# Patient Record
Sex: MALE | Race: WHITE | Employment: FULL TIME | URBAN - METROPOLITAN AREA
[De-identification: names, ages, dates, MRNs, and addresses within clinical notes are randomized per-mention and may not be internally consistent; named-entity substitution may affect disease eponyms.]

---

## 2018-04-18 VITALS
SYSTOLIC BLOOD PRESSURE: 124 MMHG | TEMPERATURE: 98.3 F | RESPIRATION RATE: 16 BRPM | HEIGHT: 75 IN | DIASTOLIC BLOOD PRESSURE: 74 MMHG | WEIGHT: 282 LBS | BODY MASS INDEX: 35.06 KG/M2 | HEART RATE: 75 BPM

## 2018-04-18 PROBLEM — M77.11 LATERAL EPICONDYLITIS OF RIGHT ELBOW: Status: ACTIVE | Noted: 2017-12-11

## 2018-04-18 RX ORDER — LEVOTHYROXINE SODIUM 0.1 MG/1
100 TABLET ORAL
COMMUNITY
End: 2018-12-11 | Stop reason: SDUPTHER

## 2018-04-18 RX ORDER — FENOFIBRATE 145 MG/1
145 TABLET, COATED ORAL
COMMUNITY
End: 2018-06-05 | Stop reason: SDUPTHER

## 2018-04-18 RX ORDER — LISINOPRIL 10 MG/1
10 TABLET ORAL
COMMUNITY
End: 2018-06-05 | Stop reason: SDUPTHER

## 2018-04-18 RX ORDER — ATORVASTATIN CALCIUM 20 MG/1
20 TABLET, FILM COATED ORAL
COMMUNITY
End: 2018-06-05 | Stop reason: SDUPTHER

## 2018-06-01 LAB
ALBUMIN SERPL-MCNC: 4.5 G/DL
ALP BLD-CCNC: 61 U/L
ALT SERPL-CCNC: 22 U/L
ANION GAP SERPL CALCULATED.3IONS-SCNC: 13 MMOL/L
AST SERPL-CCNC: 20 U/L
AVERAGE GLUCOSE: 116.9
BILIRUB SERPL-MCNC: 0.4 MG/DL (ref 0.1–1.4)
BUN BLDV-MCNC: 15 MG/DL
CALCIUM SERPL-MCNC: 9.5 MG/DL
CHLORIDE BLD-SCNC: 103 MMOL/L
CHOLESTEROL, TOTAL: 153 MG/DL
CHOLESTEROL/HDL RATIO: NORMAL
CO2: 24 MMOL/L
CREAT SERPL-MCNC: 1.1 MG/DL
GFR CALCULATED: >60
GLUCOSE BLD-MCNC: 104 MG/DL
HBA1C MFR BLD: 5.7 %
HDLC SERPL-MCNC: 40 MG/DL (ref 35–70)
LDL CHOLESTEROL CALCULATED: 64 MG/DL (ref 0–160)
POTASSIUM SERPL-SCNC: 5 MMOL/L
PROSTATE SPECIFIC ANTIGEN: 1.27 NG/ML
SODIUM BLD-SCNC: 140 MMOL/L
TOTAL PROTEIN: 6.5
TRIGL SERPL-MCNC: 243 MG/DL
VLDLC SERPL CALC-MCNC: 49 MG/DL

## 2018-06-05 ENCOUNTER — OFFICE VISIT (OUTPATIENT)
Dept: PRIMARY CARE CLINIC | Age: 58
End: 2018-06-05

## 2018-06-05 VITALS
SYSTOLIC BLOOD PRESSURE: 118 MMHG | HEART RATE: 56 BPM | TEMPERATURE: 97.8 F | DIASTOLIC BLOOD PRESSURE: 72 MMHG | RESPIRATION RATE: 12 BRPM | BODY MASS INDEX: 35.56 KG/M2 | HEIGHT: 75 IN | WEIGHT: 286 LBS

## 2018-06-05 DIAGNOSIS — R73.01 IMPAIRED FASTING BLOOD SUGAR: ICD-10-CM

## 2018-06-05 DIAGNOSIS — E66.9 OBESITY (BMI 35.0-39.9 WITHOUT COMORBIDITY): ICD-10-CM

## 2018-06-05 DIAGNOSIS — E78.5 HYPERLIPIDEMIA, ACQUIRED: ICD-10-CM

## 2018-06-05 DIAGNOSIS — E03.9 HYPOTHYROIDISM (ACQUIRED): ICD-10-CM

## 2018-06-05 DIAGNOSIS — I10 HTN (HYPERTENSION), BENIGN: Primary | ICD-10-CM

## 2018-06-05 DIAGNOSIS — N40.0 BPH WITHOUT OBSTRUCTION/LOWER URINARY TRACT SYMPTOMS: ICD-10-CM

## 2018-06-05 DIAGNOSIS — N52.9 ERECTILE DYSFUNCTION, UNSPECIFIED ERECTILE DYSFUNCTION TYPE: ICD-10-CM

## 2018-06-05 DIAGNOSIS — K21.9 GERD WITHOUT ESOPHAGITIS: ICD-10-CM

## 2018-06-05 LAB
BILIRUBIN, POC: NORMAL
BLOOD URINE, POC: NORMAL
CLARITY, POC: CLEAR
COLOR, POC: YELLOW
GLUCOSE URINE, POC: NORMAL
KETONES, POC: NORMAL
LEUKOCYTE EST, POC: NORMAL
NITRITE, POC: NORMAL
PH, POC: 7.5
PROTEIN, POC: NORMAL
SPECIFIC GRAVITY, POC: 1.01
UROBILINOGEN, POC: 0.2

## 2018-06-05 PROCEDURE — 93000 ELECTROCARDIOGRAM COMPLETE: CPT | Performed by: NURSE PRACTITIONER

## 2018-06-05 PROCEDURE — 99215 OFFICE O/P EST HI 40 MIN: CPT | Performed by: NURSE PRACTITIONER

## 2018-06-05 PROCEDURE — 81002 URINALYSIS NONAUTO W/O SCOPE: CPT | Performed by: NURSE PRACTITIONER

## 2018-06-05 RX ORDER — TADALAFIL 5 MG/1
5 TABLET ORAL DAILY
Qty: 90 TABLET | Refills: 1 | Status: SHIPPED | OUTPATIENT
Start: 2018-06-05 | End: 2019-06-07 | Stop reason: DRUGHIGH

## 2018-06-05 RX ORDER — FENOFIBRATE 145 MG/1
145 TABLET, COATED ORAL DAILY
Qty: 90 TABLET | Refills: 1 | Status: SHIPPED | OUTPATIENT
Start: 2018-06-05 | End: 2018-12-11 | Stop reason: SDUPTHER

## 2018-06-05 RX ORDER — LISINOPRIL 10 MG/1
10 TABLET ORAL DAILY
Qty: 90 TABLET | Refills: 1 | Status: SHIPPED | OUTPATIENT
Start: 2018-06-05 | End: 2018-12-11 | Stop reason: SDUPTHER

## 2018-06-05 RX ORDER — ATORVASTATIN CALCIUM 20 MG/1
20 TABLET, FILM COATED ORAL DAILY
Qty: 90 TABLET | Refills: 1 | Status: SHIPPED | OUTPATIENT
Start: 2018-06-05 | End: 2018-12-11 | Stop reason: SDUPTHER

## 2018-06-05 ASSESSMENT — ENCOUNTER SYMPTOMS
DIARRHEA: 0
ABDOMINAL DISTENTION: 0
COUGH: 0
VOMITING: 0
ABDOMINAL PAIN: 0
NAUSEA: 0
CONSTIPATION: 0
CHEST TIGHTNESS: 0
SHORTNESS OF BREATH: 0

## 2018-06-05 ASSESSMENT — PATIENT HEALTH QUESTIONNAIRE - PHQ9
SUM OF ALL RESPONSES TO PHQ QUESTIONS 1-9: 0
1. LITTLE INTEREST OR PLEASURE IN DOING THINGS: 0
SUM OF ALL RESPONSES TO PHQ9 QUESTIONS 1 & 2: 0
2. FEELING DOWN, DEPRESSED OR HOPELESS: 0

## 2018-11-28 DIAGNOSIS — E78.5 HYPERLIPIDEMIA, ACQUIRED: ICD-10-CM

## 2018-11-28 DIAGNOSIS — I10 HTN (HYPERTENSION), BENIGN: ICD-10-CM

## 2018-11-28 DIAGNOSIS — E66.9 OBESITY (BMI 35.0-39.9 WITHOUT COMORBIDITY): ICD-10-CM

## 2018-11-28 DIAGNOSIS — R73.01 IMPAIRED FASTING BLOOD SUGAR: ICD-10-CM

## 2018-11-28 LAB
ALBUMIN SERPL-MCNC: 4.3 G/DL (ref 3.4–5)
ALP BLD-CCNC: 61 U/L (ref 40–129)
ALT SERPL-CCNC: 17 U/L (ref 10–40)
ANION GAP SERPL CALCULATED.3IONS-SCNC: 15 MMOL/L (ref 3–16)
AST SERPL-CCNC: 17 U/L (ref 15–37)
BASOPHILS ABSOLUTE: 0 K/UL (ref 0–0.2)
BASOPHILS RELATIVE PERCENT: 0.6 %
BILIRUB SERPL-MCNC: 0.4 MG/DL (ref 0–1)
BILIRUBIN DIRECT: <0.2 MG/DL (ref 0–0.3)
BILIRUBIN, INDIRECT: NORMAL MG/DL (ref 0–1)
BUN BLDV-MCNC: 15 MG/DL (ref 7–20)
CALCIUM SERPL-MCNC: 9.6 MG/DL (ref 8.3–10.6)
CHLORIDE BLD-SCNC: 101 MMOL/L (ref 99–110)
CHOLESTEROL, TOTAL: 167 MG/DL (ref 0–199)
CO2: 23 MMOL/L (ref 21–32)
CREAT SERPL-MCNC: 1.2 MG/DL (ref 0.8–1.3)
EOSINOPHILS ABSOLUTE: 0.3 K/UL (ref 0–0.6)
EOSINOPHILS RELATIVE PERCENT: 5.4 %
GFR AFRICAN AMERICAN: >60
GFR NON-AFRICAN AMERICAN: >60
GLUCOSE FASTING: 116 MG/DL (ref 70–99)
HCT VFR BLD CALC: 45.3 % (ref 40.5–52.5)
HDLC SERPL-MCNC: 44 MG/DL (ref 40–60)
HEMOGLOBIN: 15.2 G/DL (ref 13.5–17.5)
LDL CHOLESTEROL CALCULATED: 98 MG/DL
LYMPHOCYTES ABSOLUTE: 1.2 K/UL (ref 1–5.1)
LYMPHOCYTES RELATIVE PERCENT: 20.2 %
MCH RBC QN AUTO: 31 PG (ref 26–34)
MCHC RBC AUTO-ENTMCNC: 33.5 G/DL (ref 31–36)
MCV RBC AUTO: 92.6 FL (ref 80–100)
MONOCYTES ABSOLUTE: 0.7 K/UL (ref 0–1.3)
MONOCYTES RELATIVE PERCENT: 11.5 %
NEUTROPHILS ABSOLUTE: 3.6 K/UL (ref 1.7–7.7)
NEUTROPHILS RELATIVE PERCENT: 62.3 %
PDW BLD-RTO: 13.9 % (ref 12.4–15.4)
PLATELET # BLD: 236 K/UL (ref 135–450)
PMV BLD AUTO: 9.1 FL (ref 5–10.5)
POTASSIUM SERPL-SCNC: 4.4 MMOL/L (ref 3.5–5.1)
RBC # BLD: 4.89 M/UL (ref 4.2–5.9)
SODIUM BLD-SCNC: 139 MMOL/L (ref 136–145)
T4 TOTAL: 7.8 UG/DL (ref 4.5–10.9)
TOTAL PROTEIN: 6.4 G/DL (ref 6.4–8.2)
TRIGL SERPL-MCNC: 125 MG/DL (ref 0–150)
TSH SERPL DL<=0.05 MIU/L-ACNC: 2.18 UIU/ML (ref 0.27–4.2)
VLDLC SERPL CALC-MCNC: 25 MG/DL
WBC # BLD: 5.8 K/UL (ref 4–11)

## 2018-12-11 ENCOUNTER — OFFICE VISIT (OUTPATIENT)
Dept: PRIMARY CARE CLINIC | Age: 58
End: 2018-12-11
Payer: COMMERCIAL

## 2018-12-11 VITALS
DIASTOLIC BLOOD PRESSURE: 94 MMHG | HEART RATE: 59 BPM | HEIGHT: 75 IN | WEIGHT: 291 LBS | BODY MASS INDEX: 36.18 KG/M2 | SYSTOLIC BLOOD PRESSURE: 146 MMHG | RESPIRATION RATE: 17 BRPM

## 2018-12-11 DIAGNOSIS — I10 HTN (HYPERTENSION), BENIGN: Primary | ICD-10-CM

## 2018-12-11 DIAGNOSIS — E78.5 HYPERLIPIDEMIA, ACQUIRED: ICD-10-CM

## 2018-12-11 DIAGNOSIS — E03.9 HYPOTHYROIDISM, UNSPECIFIED TYPE: ICD-10-CM

## 2018-12-11 DIAGNOSIS — N52.9 ERECTILE DYSFUNCTION, UNSPECIFIED ERECTILE DYSFUNCTION TYPE: ICD-10-CM

## 2018-12-11 DIAGNOSIS — R73.03 PREDIABETES: ICD-10-CM

## 2018-12-11 PROCEDURE — 99214 OFFICE O/P EST MOD 30 MIN: CPT | Performed by: FAMILY MEDICINE

## 2018-12-11 RX ORDER — TADALAFIL 20 MG/1
20 TABLET ORAL PRN
Qty: 10 TABLET | Refills: 5 | Status: SHIPPED | OUTPATIENT
Start: 2018-12-11 | End: 2019-06-07 | Stop reason: SDUPTHER

## 2018-12-11 RX ORDER — LISINOPRIL 10 MG/1
10 TABLET ORAL DAILY
Qty: 90 TABLET | Refills: 1 | Status: SHIPPED | OUTPATIENT
Start: 2018-12-11 | End: 2019-06-07 | Stop reason: SDUPTHER

## 2018-12-11 RX ORDER — ATORVASTATIN CALCIUM 20 MG/1
20 TABLET, FILM COATED ORAL DAILY
Qty: 90 TABLET | Refills: 1 | Status: SHIPPED | OUTPATIENT
Start: 2018-12-11 | End: 2019-06-07 | Stop reason: SDUPTHER

## 2018-12-11 RX ORDER — LEVOTHYROXINE SODIUM 0.1 MG/1
100 TABLET ORAL DAILY
Qty: 90 TABLET | Refills: 1 | Status: SHIPPED | OUTPATIENT
Start: 2018-12-11 | End: 2019-06-07 | Stop reason: SDUPTHER

## 2018-12-11 RX ORDER — FENOFIBRATE 145 MG/1
145 TABLET, COATED ORAL DAILY
Qty: 90 TABLET | Refills: 1 | Status: SHIPPED | OUTPATIENT
Start: 2018-12-11 | End: 2019-06-07 | Stop reason: SDUPTHER

## 2018-12-11 ASSESSMENT — ENCOUNTER SYMPTOMS
VOMITING: 0
NAUSEA: 0
APNEA: 0
EYE ITCHING: 0
WHEEZING: 0
EYE PAIN: 0
EYES NEGATIVE: 1
CONSTIPATION: 0
CHEST TIGHTNESS: 0
GASTROINTESTINAL NEGATIVE: 1
RESPIRATORY NEGATIVE: 1
COUGH: 0
BACK PAIN: 0
ABDOMINAL PAIN: 0
SINUS PRESSURE: 0
PHOTOPHOBIA: 0
DIARRHEA: 0
SORE THROAT: 0
EYE DISCHARGE: 0
SHORTNESS OF BREATH: 0
COLOR CHANGE: 0
ABDOMINAL DISTENTION: 0
TROUBLE SWALLOWING: 0

## 2018-12-11 NOTE — PROGRESS NOTES
K/uL Final    Eosinophils # 11/28/2018 0.3  0.0 - 0.6 K/uL Final    Basophils # 11/28/2018 0.0  0.0 - 0.2 K/uL Final    Total Protein 11/28/2018 6.4  6.4 - 8.2 g/dL Final    Alb 11/28/2018 4.3  3.4 - 5.0 g/dL Final    Alkaline Phosphatase 11/28/2018 61  40 - 129 U/L Final    ALT 11/28/2018 17  10 - 40 U/L Final    AST 11/28/2018 17  15 - 37 U/L Final    Total Bilirubin 11/28/2018 0.4  0.0 - 1.0 mg/dL Final    Bilirubin, Direct 11/28/2018 <0.2  0.0 - 0.3 mg/dL Final    Bilirubin, Indirect 11/28/2018 see below  0.0 - 1.0 mg/dL Final    Comment: Indirect Bilirubin cannot be calculated since Total Bilirubin  and/or Direct Bilirubin is below measurable range.  Cholesterol, Total 11/28/2018 167  0 - 199 mg/dL Final    Triglycerides 11/28/2018 125  0 - 150 mg/dL Final    HDL 11/28/2018 44  40 - 60 mg/dL Final    LDL Calculated 11/28/2018 98  <100 mg/dL Final    VLDL Cholesterol Calculated 11/28/2018 25  Not Established mg/dL Final    TSH 11/28/2018 2.18  0.27 - 4.20 uIU/mL Final    T4, Total 11/28/2018 7.8  4.5 - 10.9 ug/dL Final    Sodium 11/28/2018 139  136 - 145 mmol/L Final    Potassium 11/28/2018 4.4  3.5 - 5.1 mmol/L Final    Chloride 11/28/2018 101  99 - 110 mmol/L Final    CO2 11/28/2018 23  21 - 32 mmol/L Final    Anion Gap 11/28/2018 15  3 - 16 Final    Glucose, Fasting 11/28/2018 116* 70 - 99 mg/dL Final    BUN 11/28/2018 15  7 - 20 mg/dL Final    CREATININE 11/28/2018 1.2  0.8 - 1.3 mg/dL Final    GFR Non- 11/28/2018 >60  >60 Final    Comment: >60 mL/min/1.73m2 EGFR, calc. for ages 25 and older using the  MDRD formula (not corrected for weight), is valid for stable  renal function.  GFR  11/28/2018 >60  >60 Final    Comment: Chronic Kidney Disease: less than 60 ml/min/1.73 sq.m. Kidney Failure: less than 15 ml/min/1.73 sq.m. Results valid for patients 18 years and older.       Calcium 11/28/2018 9.6  8.3 - 10.6 mg/dL Final PHYSICAL EXAM  BP (!) 146/94 (Site: Right Upper Arm, Position: Sitting)   Pulse 59   Resp 17   Ht 6' 3\" (1.905 m)   Wt 291 lb (132 kg)   BMI 36.37 kg/m²     BP Readings from Last 3 Encounters:   12/11/18 (!) 146/94   06/05/18 118/72   04/11/18 124/74       Wt Readings from Last 3 Encounters:   12/11/18 291 lb (132 kg)   06/05/18 286 lb (129.7 kg)   04/11/18 282 lb (127.9 kg)        Physical Exam   Constitutional: He is oriented to person, place, and time. He appears well-developed and well-nourished. No distress. HENT:   Head: Normocephalic and atraumatic. Right Ear: External ear normal.   Left Ear: External ear normal.   Nose: Nose normal.   Mouth/Throat: Oropharynx is clear and moist. No oropharyngeal exudate. Eyes: Pupils are equal, round, and reactive to light. Conjunctivae and EOM are normal. Right eye exhibits no discharge. Left eye exhibits no discharge. No scleral icterus. Neck: Normal range of motion. Neck supple. No JVD present. No tracheal deviation present. No thyromegaly present. Cardiovascular: Normal rate and regular rhythm. Exam reveals no gallop and no friction rub. No murmur heard. Pulmonary/Chest: Effort normal and breath sounds normal. No stridor. No respiratory distress. He has no wheezes. He has no rales. He exhibits no tenderness. Abdominal: Soft. Bowel sounds are normal. He exhibits no distension and no mass. There is no tenderness. There is no rebound and no guarding. Musculoskeletal: Normal range of motion. He exhibits no edema, tenderness or deformity. Lymphadenopathy:     He has no cervical adenopathy. Neurological: He is alert and oriented to person, place, and time. He has normal reflexes. He displays normal reflexes. No cranial nerve deficit. He exhibits normal muscle tone. Coordination normal.   Skin: Skin is warm and dry. No rash noted. He is not diaphoretic. No erythema. No pallor. Psychiatric: He has a normal mood and affect.  His behavior is normal. Judgment and thought content normal.   Vitals reviewed. ASSESSMENT/PLAN:  Karla Baum was seen today for hyperlipidemia, hypertension and hypothyroidism. Diagnoses and all orders for this visit:    Hypothyroidism, unspecified type  -     levothyroxine (SYNTHROID) 100 MCG tablet; Take 1 tablet by mouth Daily  -     T4, Free  -     TSH without Reflex  Continue his current dose of his Synthroid. Recommend rechecking his thyroid functions in 6 months. He was given a 90 day supply with a refill. HTN (hypertension), benign  -     lisinopril (PRINIVIL;ZESTRIL) 10 MG tablet; Take 1 tablet by mouth daily  Blood pressure is elevated today but he's been off his medication for 5 days restart his lisinopril 10 mg a day recheck his blood pressure in 6 months he was given a 90 day supply with a refill. Hyperlipidemia, acquired  -     fenofibrate (TRICOR) 145 MG tablet; Take 1 tablet by mouth daily  -     atorvastatin (LIPITOR) 20 MG tablet; Take 1 tablet by mouth daily  -     Comprehensive Metabolic Panel, Fasting  -     Lipid, Fasting  Continue his current medication as above he was given 90 day supply with a refill recheck his lipids and LFTs in 6 months. Prediabetes  -     Hemoglobin A1C  Discussed lifestyle modification. Recheck a seem globin A1c and fasting blood sugar in 6 months. Erectile dysfunction, unspecified erectile dysfunction type  -     tadalafil (CIALIS) 20 MG tablet; Take 1 tablet by mouth as needed for Erectile Dysfunction  Discussed Viagra 100 mg versus Cialis 20 mg.  We'll go with the 20 mg Cialis No. 10 with 5 refills recheck him in 6 months sooner p.r.n. Return in about 6 months (around 6/11/2019) for Prediabetes, thyroid check, blood pressure check, cholesterol check.   Reviewed and/or ordered clinicallab results Yes  Reviewed and/or ordered radiology tests No  Reviewed and/or ordered other diagnostic tests No  Discussed test results with performing physicianNo  Independently reviewed image,

## 2019-06-04 DIAGNOSIS — E03.9 HYPOTHYROIDISM, UNSPECIFIED TYPE: ICD-10-CM

## 2019-06-04 DIAGNOSIS — E78.5 HYPERLIPIDEMIA, ACQUIRED: ICD-10-CM

## 2019-06-04 DIAGNOSIS — I10 HTN (HYPERTENSION), BENIGN: ICD-10-CM

## 2019-06-04 DIAGNOSIS — R73.03 PREDIABETES: ICD-10-CM

## 2019-06-04 LAB
BASOPHILS ABSOLUTE: 0 K/UL (ref 0–0.2)
BASOPHILS RELATIVE PERCENT: 0.5 %
EOSINOPHILS ABSOLUTE: 0.3 K/UL (ref 0–0.6)
EOSINOPHILS RELATIVE PERCENT: 4.2 %
HCT VFR BLD CALC: 44.4 % (ref 40.5–52.5)
HEMOGLOBIN: 14.6 G/DL (ref 13.5–17.5)
LYMPHOCYTES ABSOLUTE: 3.3 K/UL (ref 1–5.1)
LYMPHOCYTES RELATIVE PERCENT: 43 %
MCH RBC QN AUTO: 30.8 PG (ref 26–34)
MCHC RBC AUTO-ENTMCNC: 33 G/DL (ref 31–36)
MCV RBC AUTO: 93.5 FL (ref 80–100)
MONOCYTES ABSOLUTE: 0.8 K/UL (ref 0–1.3)
MONOCYTES RELATIVE PERCENT: 9.9 %
NEUTROPHILS ABSOLUTE: 3.3 K/UL (ref 1.7–7.7)
NEUTROPHILS RELATIVE PERCENT: 42.4 %
PDW BLD-RTO: 14.3 % (ref 12.4–15.4)
PLATELET # BLD: 260 K/UL (ref 135–450)
PMV BLD AUTO: 8.8 FL (ref 5–10.5)
PROSTATE SPECIFIC ANTIGEN: 1.62 NG/ML (ref 0–4)
RBC # BLD: 4.75 M/UL (ref 4.2–5.9)
T4 FREE: 1.6 NG/DL (ref 0.9–1.8)
TSH SERPL DL<=0.05 MIU/L-ACNC: 2.34 UIU/ML (ref 0.27–4.2)
WBC # BLD: 7.7 K/UL (ref 4–11)

## 2019-06-05 LAB
A/G RATIO: 1.9 (ref 1.1–2.2)
ALBUMIN SERPL-MCNC: 4.4 G/DL (ref 3.4–5)
ALP BLD-CCNC: 63 U/L (ref 40–129)
ALT SERPL-CCNC: 21 U/L (ref 10–40)
ANION GAP SERPL CALCULATED.3IONS-SCNC: 19 MMOL/L (ref 3–16)
AST SERPL-CCNC: 21 U/L (ref 15–37)
BILIRUB SERPL-MCNC: 0.5 MG/DL (ref 0–1)
BUN BLDV-MCNC: 14 MG/DL (ref 7–20)
CALCIUM SERPL-MCNC: 9.7 MG/DL (ref 8.3–10.6)
CHLORIDE BLD-SCNC: 105 MMOL/L (ref 99–110)
CHOLESTEROL, TOTAL: 166 MG/DL (ref 0–199)
CO2: 20 MMOL/L (ref 21–32)
CREAT SERPL-MCNC: 1.3 MG/DL (ref 0.8–1.3)
GFR AFRICAN AMERICAN: >60
GFR NON-AFRICAN AMERICAN: 56
GLOBULIN: 2.3 G/DL
GLUCOSE BLD-MCNC: 118 MG/DL (ref 70–99)
HDLC SERPL-MCNC: 43 MG/DL (ref 40–60)
LDL CHOLESTEROL CALCULATED: 85 MG/DL
POTASSIUM SERPL-SCNC: 4.6 MMOL/L (ref 3.5–5.1)
SODIUM BLD-SCNC: 144 MMOL/L (ref 136–145)
TOTAL PROTEIN: 6.7 G/DL (ref 6.4–8.2)
TRIGL SERPL-MCNC: 192 MG/DL (ref 0–150)
VLDLC SERPL CALC-MCNC: 38 MG/DL

## 2019-06-07 ENCOUNTER — OFFICE VISIT (OUTPATIENT)
Dept: PRIMARY CARE CLINIC | Age: 59
End: 2019-06-07
Payer: COMMERCIAL

## 2019-06-07 VITALS
SYSTOLIC BLOOD PRESSURE: 114 MMHG | BODY MASS INDEX: 37.25 KG/M2 | HEART RATE: 61 BPM | DIASTOLIC BLOOD PRESSURE: 80 MMHG | OXYGEN SATURATION: 98 % | TEMPERATURE: 98 F | WEIGHT: 298 LBS

## 2019-06-07 DIAGNOSIS — E66.01 CLASS 2 SEVERE OBESITY DUE TO EXCESS CALORIES WITH SERIOUS COMORBIDITY AND BODY MASS INDEX (BMI) OF 37.0 TO 37.9 IN ADULT (HCC): ICD-10-CM

## 2019-06-07 DIAGNOSIS — E78.5 HYPERLIPIDEMIA, ACQUIRED: ICD-10-CM

## 2019-06-07 DIAGNOSIS — N52.9 ERECTILE DYSFUNCTION, UNSPECIFIED ERECTILE DYSFUNCTION TYPE: ICD-10-CM

## 2019-06-07 DIAGNOSIS — I10 HTN (HYPERTENSION), BENIGN: Primary | ICD-10-CM

## 2019-06-07 DIAGNOSIS — E03.9 HYPOTHYROIDISM, UNSPECIFIED TYPE: ICD-10-CM

## 2019-06-07 DIAGNOSIS — R73.03 PREDIABETES: ICD-10-CM

## 2019-06-07 LAB
BILIRUBIN, POC: NORMAL
BLOOD URINE, POC: NORMAL
CLARITY, POC: CLEAR
COLOR, POC: YELLOW
CREATININE URINE POCT: 50
GLUCOSE URINE, POC: NORMAL
KETONES, POC: NORMAL
LEUKOCYTE EST, POC: NORMAL
MICROALBUMIN/CREAT 24H UR: 10 MG/G{CREAT}
MICROALBUMIN/CREAT UR-RTO: <30
NITRITE, POC: NORMAL
PH, POC: 7
PROTEIN, POC: NORMAL
SPECIFIC GRAVITY, POC: 1.02
UROBILINOGEN, POC: 0.2

## 2019-06-07 PROCEDURE — 82044 UR ALBUMIN SEMIQUANTITATIVE: CPT | Performed by: NURSE PRACTITIONER

## 2019-06-07 PROCEDURE — 81002 URINALYSIS NONAUTO W/O SCOPE: CPT | Performed by: NURSE PRACTITIONER

## 2019-06-07 PROCEDURE — 99214 OFFICE O/P EST MOD 30 MIN: CPT | Performed by: NURSE PRACTITIONER

## 2019-06-07 RX ORDER — LEVOTHYROXINE SODIUM 0.1 MG/1
100 TABLET ORAL DAILY
Qty: 90 TABLET | Refills: 1 | Status: SHIPPED | OUTPATIENT
Start: 2019-06-07 | End: 2019-12-11 | Stop reason: SDUPTHER

## 2019-06-07 RX ORDER — TADALAFIL 20 MG/1
20 TABLET ORAL PRN
Qty: 10 TABLET | Refills: 5 | Status: SHIPPED | OUTPATIENT
Start: 2019-06-07

## 2019-06-07 RX ORDER — ATORVASTATIN CALCIUM 40 MG/1
40 TABLET, FILM COATED ORAL DAILY
Qty: 90 TABLET | Refills: 1 | Status: SHIPPED | OUTPATIENT
Start: 2019-06-07

## 2019-06-07 RX ORDER — FENOFIBRATE 145 MG/1
145 TABLET, COATED ORAL DAILY
Qty: 90 TABLET | Refills: 1 | Status: SHIPPED | OUTPATIENT
Start: 2019-06-07

## 2019-06-07 RX ORDER — LISINOPRIL 10 MG/1
10 TABLET ORAL DAILY
Qty: 90 TABLET | Refills: 1 | Status: SHIPPED | OUTPATIENT
Start: 2019-06-07

## 2019-06-07 ASSESSMENT — ENCOUNTER SYMPTOMS
CONSTIPATION: 0
SHORTNESS OF BREATH: 0
COUGH: 0
NAUSEA: 0
DIARRHEA: 0
ABDOMINAL DISTENTION: 0
TROUBLE SWALLOWING: 0
CHEST TIGHTNESS: 0
ABDOMINAL PAIN: 0

## 2019-06-07 NOTE — PROGRESS NOTES
6/7/2019    Chief Complaint   Patient presents with    Hypertension     f/u, need refill, labs done 06/04. pt have not checking b/p at home.  Hyperlipidemia     f/u, need refill, labs done 06/04.  Hypothyroidism     f/u, need refill, labs done 06/04. HPI:  [facility] is here for follow up with HTN, Hyperlipidemia, and Hypothyroidism. He states that was in ER for chest pain/pressure while at work in April and was evaluated in ED at [facility]. All testing was negative. He followed up with cardiology and was instructed to increase his Atorvastatin to 40mg. He states has had no additional chest pain, shortness of breath, dizziness, headaches or fatigue. He Does not monitor blood pressure at home. He has no new complaints or concerns today. Review of Systems   Constitutional: Negative for chills, fatigue and fever. HENT: Negative for trouble swallowing. Eyes: Negative for visual disturbance. Respiratory: Negative for cough, chest tightness and shortness of breath. Cardiovascular: Negative for chest pain, palpitations and leg swelling. Gastrointestinal: Negative for abdominal distention, abdominal pain, constipation, diarrhea and nausea. Endocrine: Negative for cold intolerance, heat intolerance, polydipsia, polyphagia and polyuria. Genitourinary: Negative for difficulty urinating, frequency and urgency. Neurological: Negative for dizziness, weakness, light-headedness and headaches. Psychiatric/Behavioral: Negative for decreased concentration, dysphoric mood and sleep disturbance. No Known Allergies  Prior to Visit Medications    Medication Sig Taking?  Authorizing Provider   lisinopril (PRINIVIL;ZESTRIL) 10 MG tablet Take 1 tablet by mouth daily Yes Chano Michele, APRN - CNP   atorvastatin (LIPITOR) 40 MG tablet Take 1 tablet by mouth daily Yes Chano Michele, APRN - CNP   levothyroxine (SYNTHROID) 100 MCG tablet Take 1 tablet by mouth Daily Yes Chano Michele, APRN - DON Packs/day: 20.00     Years: 2.00     Pack years: 40.00     Types: Cigarettes     Last attempt to quit: 1983     Years since quittin.0    Smokeless tobacco: Never Used   Substance and Sexual Activity    Alcohol use:  Yes     Alcohol/week: 4.2 oz     Types: 2 Glasses of wine, 5 Cans of beer per week     Comment: 3 x per week    Drug use: No    Sexual activity: None   Lifestyle    Physical activity:     Days per week: None     Minutes per session: None    Stress: None   Relationships    Social connections:     Talks on phone: None     Gets together: None     Attends Mu-ism service: None     Active member of club or organization: None     Attends meetings of clubs or organizations: None     Relationship status: None    Intimate partner violence:     Fear of current or ex partner: None     Emotionally abused: None     Physically abused: None     Forced sexual activity: None   Other Topics Concern    None   Social History Narrative    None     Family History   Problem Relation Age of Onset    Other Other         Stent, GB, Leukemia     Patient Active Problem List   Diagnosis    Actinic keratosis    Benign neoplasm of skin    Contact dermatitis and other eczema, due to unspecified cause    Hypertrophic and atrophic condition of skin    Lateral epicondylitis of right elbow    Neoplasm of uncertain behavior of skin    Other dyschromia    Other seborrheic keratosis    Class 2 severe obesity due to excess calories with serious comorbidity and body mass index (BMI) of 37.0 to 37.9 in adult [facility])        LABS:  Orders Only on 2019   Component Date Value Ref Range Status    TSH 2019 2.34  0.27 - 4.20 uIU/mL Final    T4 Free 2019 1.6  0.9 - 1.8 ng/dL Final    Cholesterol, Total 2019 166  0 - 199 mg/dL Final    Triglycerides 2019 192* 0 - 150 mg/dL Final    HDL 2019 43  40 - 60 mg/dL Final    LDL Calculated 2019 85  <100 mg/dL Final    VLDL Cholesterol Calculated 06/04/2019 38  Not Established mg/dL Final    Sodium 06/04/2019 144  136 - 145 mmol/L Final    Potassium 06/04/2019 4.6  3.5 - 5.1 mmol/L Final    Chloride 06/04/2019 105  99 - 110 mmol/L Final    CO2 06/04/2019 20* 21 - 32 mmol/L Final    Anion Gap 06/04/2019 19* 3 - 16 Final    Glucose 06/04/2019 118* 70 - 99 mg/dL Final    BUN 06/04/2019 14  7 - 20 mg/dL Final    CREATININE 06/04/2019 1.3  0.8 - 1.3 mg/dL Final    GFR Non- 06/04/2019 56* >60 Final    Comment: >60 mL/min/1.73m2 EGFR, calc. for ages 25 and older using the  MDRD formula (not corrected for weight), is valid for stable  renal function.  GFR  06/04/2019 >60  >60 Final    Comment: Chronic Kidney Disease: less than 60 ml/min/1.73 sq.m. Kidney Failure: less than 15 ml/min/1.73 sq.m. Results valid for patients 18 years and older.       Calcium 06/04/2019 9.7  8.3 - 10.6 mg/dL Final    Total Protein 06/04/2019 6.7  6.4 - 8.2 g/dL Final    Alb 06/04/2019 4.4  3.4 - 5.0 g/dL Final    Albumin/Globulin Ratio 06/04/2019 1.9  1.1 - 2.2 Final    Total Bilirubin 06/04/2019 0.5  0.0 - 1.0 mg/dL Final    Alkaline Phosphatase 06/04/2019 63  40 - 129 U/L Final    ALT 06/04/2019 21  10 - 40 U/L Final    AST 06/04/2019 21  15 - 37 U/L Final    Globulin 06/04/2019 2.3  g/dL Final    WBC 06/04/2019 7.7  4.0 - 11.0 K/uL Final    RBC 06/04/2019 4.75  4.20 - 5.90 M/uL Final    Hemoglobin 06/04/2019 14.6  13.5 - 17.5 g/dL Final    Hematocrit 06/04/2019 44.4  40.5 - 52.5 % Final    MCV 06/04/2019 93.5  80.0 - 100.0 fL Final    MCH 06/04/2019 30.8  26.0 - 34.0 pg Final    MCHC 06/04/2019 33.0  31.0 - 36.0 g/dL Final    RDW 06/04/2019 14.3  12.4 - 15.4 % Final    Platelets 63/73/2235 260  135 - 450 K/uL Final    MPV 06/04/2019 8.8  5.0 - 10.5 fL Final    Neutrophils % 06/04/2019 42.4  % Final    Lymphocytes % 06/04/2019 43.0  % Final    Monocytes % 06/04/2019 9.9  % Final    adenopathy. Neurological: He is alert and oriented to person, place, and time. He has normal strength and normal reflexes. No cranial nerve deficit or sensory deficit. Coordination normal.   Skin: Skin is warm and dry. Capillary refill takes less than 2 seconds. No rash noted. No erythema. No pallor. Psychiatric: He has a normal mood and affect. His behavior is normal.   Nursing note and vitals reviewed. ASSESSMENT/PLAN:  Marya Dietz was seen today for hypertension, hyperlipidemia and hypothyroidism. Diagnoses and all orders for this visit:    HTN (hypertension), benign  -     Cancel: Urinalysis  -     lisinopril (PRINIVIL;ZESTRIL) 10 MG tablet; Take 1 tablet by mouth daily  -     POCT Urinalysis no Micro-unremarkbale  -     Comprehensive Metabolic Panel, Fasting; Future  -     Lipid, Fasting; Future  DASH diet , Goal BP < 130/80  Weight loss  Hyperlipidemia, acquired  -   Increase  atorvastatin (LIPITOR) 40 MG tablet; Take 1 tablet by mouth daily  -     fenofibrate (TRICOR) 145 MG tablet; Take 1 tablet by mouth daily  I have reviewed OV note from Dr Eliezer Chery and noted increase to atorvastatin  Reinforced lifestyle modifications needed  Hypothyroidism, unspecified type  -     levothyroxine (SYNTHROID) 100 MCG tablet; Take 1 tablet by mouth Daily  -     T4, Free; Future  -     TSH without Reflex; Future    Prediabetes  -     POCT microalbumin-normal  -     Comprehensive Metabolic Panel, Fasting; Future  Reinforced need to improve diet, decrease CHO intake and increase exercise  Erectile dysfunction, unspecified erectile dysfunction type  -     tadalafil (CIALIS) 20 MG tablet;  Take 1 tablet by mouth as needed for Erectile Dysfunction  PSA 1.62; repeat in one year  Class 2 severe obesity due to excess calories with serious comorbidity and body mass index (BMI) of 37.0 to 37.9 in adult [facility])  Lifestyle modifications discussed; increase protein and exercise with decrease to calorie consumption and fat        Return in about 6 months (around 12/7/2019) for HTN, HYPERLIPIDEMIA, hypothryroidism. Reviewed and/or ordered clinicallab results Yes  Reviewed and/or ordered radiology tests No  Reviewed and/or ordered other diagnostic tests Yes  Discussed test results with performing physicianNo  Independently reviewed image, tracing, or specimen Yes  Made a decision to obtain old records No  Reviewed and summarized old records Yes      Coral Urena was counseled regarding symptoms of current diagnosis, course and complications of disease if inadequately treated, side effects of medications, diagnosis, treatment options, andprognosis, risks, benefits, complications, and alternatives of treatment, labs, imaging and other studies and treatment targets and goals. He understands instructions and counseling. This dictation was performed with a verbal recognition program (DRAGON) and it was checked for errors. It is possible that there are still dictated errors within this office note. If so, pleasebring any errors to my attention for an addendum. All efforts were made to ensure that this office note is accurate.

## 2019-06-19 ENCOUNTER — TELEPHONE (OUTPATIENT)
Dept: PRIMARY CARE CLINIC | Age: 59
End: 2019-06-19

## 2019-06-19 NOTE — TELEPHONE ENCOUNTER
Prior authorization for Tadalafil 20MG was denied. Due to the [employer]) quantity limit rule, the prior authorization request was denied because patient's pharmacy benefit covers a maximum of 6 tablets per 30 days. Sent message to patient via [address]

## 2019-12-10 DIAGNOSIS — E03.9 HYPOTHYROIDISM, UNSPECIFIED TYPE: ICD-10-CM

## 2019-12-11 RX ORDER — LEVOTHYROXINE SODIUM 0.1 MG/1
100 TABLET ORAL DAILY
Qty: 90 TABLET | Refills: 1 | Status: SHIPPED | OUTPATIENT
Start: 2019-12-11